# Patient Record
Sex: FEMALE | Race: WHITE | NOT HISPANIC OR LATINO | URBAN - METROPOLITAN AREA
[De-identification: names, ages, dates, MRNs, and addresses within clinical notes are randomized per-mention and may not be internally consistent; named-entity substitution may affect disease eponyms.]

---

## 2022-10-27 ENCOUNTER — APPOINTMENT (RX ONLY)
Dept: URBAN - METROPOLITAN AREA CLINIC 12 | Facility: CLINIC | Age: 62
Setting detail: DERMATOLOGY
End: 2022-10-27

## 2022-10-27 DIAGNOSIS — Z41.9 ENCOUNTER FOR PROCEDURE FOR PURPOSES OTHER THAN REMEDYING HEALTH STATE, UNSPECIFIED: ICD-10-CM

## 2022-10-27 DIAGNOSIS — Z41.1 ENCOUNTER FOR COSMETIC SURGERY: ICD-10-CM

## 2022-10-27 PROCEDURE — ? BOTOX

## 2022-10-27 PROCEDURE — ? PATIENT SPECIFIC COUNSELING

## 2022-10-27 PROCEDURE — ? CONSULTATION - ABDOMINOPLASTY

## 2022-10-27 PROCEDURE — ? CONSULTATION - AGING FACE

## 2022-10-27 PROCEDURE — ? CONSULTATION - S/P WEIGHT LOSS

## 2022-10-27 NOTE — PROCEDURE: BOTOX
Expiration Date (Month Year): 7/24
Forehead Units: 10
Perioral Units: 0
Glabellar Complex Units: 15
Price Per Unit In $ (Use Numbers Only, No Text Please.): 12.5
Bill Summary Price Listed Below, Or Bill Total Of Units X Price Per Unit?: Bill #Units x Price Per Unit
Dilution (U/0.1 Cc): 4
Consent: Written consent was obtained prior to the procedure. Risks, benefits, expectations and alternatives were discussed including, but not limited to, infection, bleeding, lid/brow ptosis, bruising, swelling, diplopia, temporary effects, incomplete chemical denervation and dissatisfaction with the cosmetic outcome. No guarantee or warranty was given or implied regarding longevity of results.
Postcare Instructions: Patient instructed to not lie down for 4 hours and limit physical activity for 24 hours. Patient instructed not to travel by airplane for 48 hours.
Administered By (Optional): Dr. Agatha Watts
Show Price In Note?: no
Lot #: [id number]
Map Statment: See attached map for complete details
Detail Level: Detailed

## 2022-11-17 ENCOUNTER — APPOINTMENT (RX ONLY)
Dept: URBAN - METROPOLITAN AREA CLINIC 12 | Facility: CLINIC | Age: 62
Setting detail: DERMATOLOGY
End: 2022-11-17

## 2022-11-17 DIAGNOSIS — Z42.8 ENCOUNTER FOR OTHER PLASTIC AND RECONSTRUCTIVE SURGERY FOLLOWING MEDICAL PROCEDURE OR HEALED INJURY: ICD-10-CM

## 2022-11-17 DIAGNOSIS — Z41.1 ENCOUNTER FOR COSMETIC SURGERY: ICD-10-CM

## 2022-11-17 PROCEDURE — ? OTHER

## 2022-11-17 PROCEDURE — ? CONSULTATION - AGING FACE

## 2022-11-17 PROCEDURE — ? PATIENT SPECIFIC COUNSELING

## 2022-11-17 ASSESSMENT — LOCATION SIMPLE DESCRIPTION DERM
LOCATION SIMPLE: GLABELLA
LOCATION SIMPLE: SUPERIOR FOREHEAD

## 2022-11-17 ASSESSMENT — LOCATION ZONE DERM: LOCATION ZONE: FACE

## 2022-11-17 ASSESSMENT — LOCATION DETAILED DESCRIPTION DERM
LOCATION DETAILED: SUPERIOR MID FOREHEAD
LOCATION DETAILED: GLABELLA

## 2022-11-17 NOTE — HPI: FOLLOW UP BOTOX
Have You Had Botox?: has had prior Botox
When Was Your Last Botox Treatment?: 10/27/2022
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 \\nPrior to her last visit, she has never had neurotoxin treatment in the past before. I explained to patient that the best way to improve static glabellar lines is with filler injections and she’s expressed that she would not like to have filler injections at this time. Patient would like to chat more about FaceTite and stated that she would not like to do the tummy tuck at this time.

## 2022-11-17 NOTE — PROCEDURE: OTHER
Sticky Other (Free Text): No need to add additional Botox today.
Detail Level: Zone
Render Risk Assessment In Note?: no

## 2022-11-22 ENCOUNTER — APPOINTMENT (RX ONLY)
Dept: URBAN - METROPOLITAN AREA OTHER 5 | Facility: OTHER | Age: 62
Setting detail: DERMATOLOGY
End: 2022-11-22

## 2022-11-22 ENCOUNTER — RX ONLY (OUTPATIENT)
Age: 62
Setting detail: RX ONLY
End: 2022-11-22

## 2022-11-22 DIAGNOSIS — Z41.1 ENCOUNTER FOR COSMETIC SURGERY: ICD-10-CM

## 2022-11-22 PROCEDURE — ? PRE-OP WORKLIST

## 2022-11-22 PROCEDURE — ? PATIENT SPECIFIC COUNSELING

## 2022-11-22 RX ORDER — CHLORHEXIDINE GLUCONATE 1.2 MG/ML
RINSE ORAL
Qty: 473 | Refills: 1 | Status: ERX | COMMUNITY
Start: 2022-11-22

## 2022-11-22 RX ORDER — AMOXICILLIN AND CLAVULANATE POTASSIUM 500; 125 MG/1; 1/1
TABLET, FILM COATED ORAL
Qty: 14 | Refills: 0 | Status: ERX | COMMUNITY
Start: 2022-11-22

## 2022-11-22 NOTE — PROCEDURE: PRE-OP WORKLIST
Admission Status: outpatient
Photos Taken?: no
Detail Level: Simple
Surgeon: Dr. Watts
Surgery Scheduled: Removal of buccal fat pads; FaceTite to lianna
Date Of Surgery: 12/2/2022

## 2022-11-22 NOTE — HPI: PREOPERATIVE APPOINTMENT
Has The Patient Completed Informed Consent?: has completed informed consent documentation
Date Of Procedure?: 12/2/2022
Surgeon: Dr. Watts

## 2022-12-02 ENCOUNTER — APPOINTMENT (RX ONLY)
Dept: URBAN - METROPOLITAN AREA CLINIC 12 | Facility: CLINIC | Age: 62
Setting detail: DERMATOLOGY
End: 2022-12-02

## 2022-12-02 DIAGNOSIS — Z41.9 ENCOUNTER FOR PROCEDURE FOR PURPOSES OTHER THAN REMEDYING HEALTH STATE, UNSPECIFIED: ICD-10-CM

## 2022-12-02 PROCEDURE — ? PATIENT SPECIFIC COUNSELING

## 2022-12-02 PROCEDURE — ? INMODE RFAL

## 2022-12-02 ASSESSMENT — LOCATION DETAILED DESCRIPTION DERM: LOCATION DETAILED: LEFT INFERIOR CENTRAL MALAR CHEEK

## 2022-12-02 ASSESSMENT — LOCATION SIMPLE DESCRIPTION DERM: LOCATION SIMPLE: LEFT CHEEK

## 2022-12-02 ASSESSMENT — LOCATION ZONE DERM: LOCATION ZONE: FACE

## 2022-12-02 NOTE — PROCEDURE: INMODE RFAL
Detail Level: Detailed
Post-Care Instructions: Patient should avoid heavy lifting or exercise for several weeks.
Infiltration: Targeted areas scrubbed with chlorhexadine gluconate, rinsed with sterile saline, intradermal blebs of tumescent local anesthetic, percutaneous infiltration with selected infiltration cannulas as per standard tumescent technique using highly dilute tumescent anesthetic solution of lidocaine, epinephrine, and bicarbonate, specific concentration used in each area is defined in surgeons orders.
Dispo: Absorptive pads and garments applied. The patient was given post-op instructions and discharged home ambulatory and in good condition.
Tumescent Anesthesia Volume In Cc: 100
Facetite Internal Cutoff Temperature: 50
Surgeon: Dr. Agatha Watts
Liposuction: Microcannular liposuction is accomplished using the standard technique with [employer] microcannnulas ([place] and [place]inesse types). The patient tolerated the procedure well. Immediately postoperatively, the treated areas were covered with super-absorbent pads and held in place with elastic compression garments.
Facetite External Cutoff Temperature: 35
Adits: 2 mm: Placed within and on the periphery of the targeted areas.
Treatment Number: 1
Consent: The risks and benefits of liposuction were discussed with the patient and include but are not limited to: infection, bruising, lumpiness, pain, anesthesia reaction, dysesthesia, scarring in treatment area or puncture point, vasovagal reactions, tachycardia, nausea, necrosis, ulceration, color change and asymmetry.
Price (Use Numbers Only, No Special Characters Or $): 5000
Handpiece: FaceTite
Was Liposuction Also Performed: No
Estimated Blood Loss (Cc): minimal

## 2022-12-19 ENCOUNTER — APPOINTMENT (RX ONLY)
Dept: URBAN - METROPOLITAN AREA OTHER 5 | Facility: OTHER | Age: 62
Setting detail: DERMATOLOGY
End: 2022-12-19

## 2022-12-19 DIAGNOSIS — Z41.1 ENCOUNTER FOR COSMETIC SURGERY: ICD-10-CM

## 2022-12-19 PROCEDURE — ? PATIENT SPECIFIC COUNSELING

## 2022-12-19 PROCEDURE — ? OTHER (COSMETIC)

## 2022-12-19 PROCEDURE — ? PRE-OP WORKLIST

## 2022-12-19 ASSESSMENT — LOCATION SIMPLE DESCRIPTION DERM: LOCATION SIMPLE: LEFT CHEEK

## 2022-12-19 ASSESSMENT — LOCATION ZONE DERM: LOCATION ZONE: FACE

## 2022-12-19 ASSESSMENT — LOCATION DETAILED DESCRIPTION DERM: LOCATION DETAILED: LEFT INFERIOR CENTRAL MALAR CHEEK

## 2022-12-19 NOTE — HPI: PREOPERATIVE APPOINTMENT
Date Of Procedure?: 01/09/2023
Facility: [facility]
Surgeon: Dr. Watts
Additional History: Patient presents today for preop\\n\\nPatient decided  to cancel surgery.\\nPatient stated that she was to concerned with post op care and to anxious. She wanted a refund.\\nPatient arrived 15 minutes early to appointment. She was unable to be seen until after my first appointment at 8:30. This was explained to patient and she stated she realized she arrived early and that would be fine. Once patient was roomed. I explained to patient that her surgery was at the outpatient center at [facility]. The patient stated she did not have any idea of why she was here for the preop. She also wanted to know why she was seeming another doctor. I explained to her that she was seeing me for a preop for her surgery not another doctor and Dr. Watts was doing her surgery. I started to explain what the preop would consist of the she stated she wanted to cancel the surgery. The patients  tried to ask if she really wanted to cancel and letting her know that it would be okay to have the surgery. Patient yelled to “stop”and continue to tell him to “stop” saying that it would be okay. She them stated she wanted to speak with someone regarding getting her money back and she had to get back to work.

## 2023-01-09 ENCOUNTER — APPOINTMENT (RX ONLY)
Dept: URBAN - METROPOLITAN AREA CLINIC 12 | Facility: CLINIC | Age: 63
Setting detail: DERMATOLOGY
End: 2023-01-09

## 2023-01-09 DIAGNOSIS — Z41.1 ENCOUNTER FOR COSMETIC SURGERY: ICD-10-CM

## 2023-01-09 PROCEDURE — ? PRE-OP WORKLIST

## 2023-01-09 PROCEDURE — ? OTHER (COSMETIC)

## 2023-01-09 NOTE — PROCEDURE: PRE-OP WORKLIST
Surgeon: Dr. Watts
Detail Level: Simple
Surgery Scheduled: Facetite and buccal fat pad removal
Date Of Evaluation: 12/19/2022
Admission Status: outpatient
Date Of Surgery: 01/09/2022

## 2023-01-09 NOTE — HPI: PREOPERATIVE APPOINTMENT
Date Of Procedure?: 1/09/022
Facility: [facility]
Surgeon: Dr. Watts
Additional History: Patient decided  to cancel surgery.\\nPatient stated that she was to concerned with post op care and to anxious. She wanted a refund.\\nPatient arrived 15 minutes early to appointment. She was unable to be seen until after my first appointment at 8:30. This was explained to patient and she stated she realized she arrived early and that would be fine. Once patient was roomed. I explained to patient that her surgery was at the outpatient center at [facility]. The patient stated she did not have any idea of why she was here for the preop. She also wanted to know why she was seeming another doctor. I explained to her that she was seeing me for a preop for her surgery not another doctor and Dr. Watts was doing her surgery. I started to explain what the preop would consist of the she stated she wanted to cancel the surgery. The patients  tried to ask if she really wanted to cancel and letting her know that it would be okay to have the surgery. Patient yelled to “stop”and continue to tell him to “stop” saying that it would be okay. She them stated she wanted to speak with someone regarding getting her money back and she had to get back to work.

## 2023-05-03 ENCOUNTER — APPOINTMENT (RX ONLY)
Dept: URBAN - METROPOLITAN AREA CLINIC 12 | Facility: CLINIC | Age: 63
Setting detail: DERMATOLOGY
End: 2023-05-03

## 2023-05-03 DIAGNOSIS — Z41.1 ENCOUNTER FOR COSMETIC SURGERY: ICD-10-CM

## 2023-05-03 PROCEDURE — ? BOTOX

## 2023-05-03 ASSESSMENT — LOCATION ZONE DERM: LOCATION ZONE: FACE

## 2023-05-03 ASSESSMENT — LOCATION SIMPLE DESCRIPTION DERM: LOCATION SIMPLE: LEFT FOREHEAD

## 2023-05-03 ASSESSMENT — LOCATION DETAILED DESCRIPTION DERM: LOCATION DETAILED: LEFT INFERIOR MEDIAL FOREHEAD

## 2023-05-03 NOTE — PROCEDURE: BOTOX
Additional Area 2 Location: gummy smile
Additional Area 1 Location: chin
Reconstitution Date: 05/03/2023
Mian Units: 0
Price Per Unit In $ (Use Numbers Only, No Text Please.): 15
Administered By (Optional): Berny Patrick PA-C
Expiration Date (Month Year): 04/25
Dilution (U/0.1 Cc): 5
Lot #: [id number]
Map Statment: See attached map for complete details
Consent: Written consent was obtained prior to the procedure. Risks, benefits, expectations and alternatives were discussed including, but not limited to, infection, bleeding, lid/brow ptosis, bruising, swelling, diplopia, temporary effects, incomplete chemical denervation and dissatisfaction with the cosmetic outcome. No guarantee or warranty was given or implied regarding longevity of results.
Use Map Statement For Sites (Optional): No
Bill Summary Price Listed Below, Or Bill Total Of Units X Price Per Unit?: Bill #Units x Price Per Unit
Detail Level: Simple
Show Price In Note?: yes
Glabellar Complex Units: 17.5
Forehead Units: 7.5
Postcare Instructions: Patient instructed to not lie down for 4 hours and limit physical activity for 24 hours. Patient instructed not to travel by airplane for 48 hours.
Additional Area 3 Location: perioral

## 2023-10-03 ENCOUNTER — APPOINTMENT (RX ONLY)
Dept: URBAN - METROPOLITAN AREA CLINIC 12 | Facility: CLINIC | Age: 63
Setting detail: DERMATOLOGY
End: 2023-10-03

## 2023-10-03 DIAGNOSIS — Z41.9 ENCOUNTER FOR PROCEDURE FOR PURPOSES OTHER THAN REMEDYING HEALTH STATE, UNSPECIFIED: ICD-10-CM

## 2023-10-03 PROCEDURE — ? BOTOX

## 2023-10-03 NOTE — PROCEDURE: BOTOX
Additional Area 3 Location: masseter
Use Map Statement For Sites (Optional): No
Detail Level: Simple
Platsyma Units: 0
Map Statment: See attached map for complete details
Reconstitution Date: 9/28/23
Postcare Instructions: Patient instructed to not lie down for 4 hours and limit physical activity for 24 hours. Patient instructed not to travel by airplane for 48 hours.
Forehead Units: 7.5
Expiration Date (Month Year): 12/01/25
Additional Area 2 Location: platysma
Lot #: [id number]
Glabellar Complex Units: 17.5
Dilution (U/0.1 Cc): 2.2
Additional Area 1 Location: chin
Consent: Written consent was obtained prior to the procedure. Risks, benefits, expectations and alternatives were discussed including, but not limited to, infection, bleeding, lid/brow ptosis, bruising, swelling, diplopia, temporary effects, incomplete chemical denervation and dissatisfaction with the cosmetic outcome. No guarantee or warranty was given or implied regarding longevity of results.
Administered By (Optional): Berny Patrick PA-C
Show Price In Note?: yes
Bill Summary Price Listed Below, Or Bill Total Of Units X Price Per Unit?: Bill #Units x Price Per Unit
Price Per Unit In $ (Use Numbers Only, No Text Please.): 18

## 2024-02-15 ENCOUNTER — APPOINTMENT (RX ONLY)
Dept: URBAN - METROPOLITAN AREA CLINIC 12 | Facility: CLINIC | Age: 64
Setting detail: DERMATOLOGY
End: 2024-02-15

## 2024-02-15 DIAGNOSIS — Z41.9 ENCOUNTER FOR PROCEDURE FOR PURPOSES OTHER THAN REMEDYING HEALTH STATE, UNSPECIFIED: ICD-10-CM

## 2024-02-15 PROCEDURE — ? BOTOX

## 2024-02-15 NOTE — PROCEDURE: BOTOX
Additional Area 3 Location: masseter
Use Map Statement For Sites (Optional): No
Detail Level: Simple
Platsyma Units: 0
Map Statment: See attached map for complete details
Reconstitution Date: 2/15/24
Postcare Instructions: Patient instructed to not lie down for 4 hours and limit physical activity for 24 hours. Patient instructed not to travel by airplane for 48 hours.
Forehead Units: 7.5
Expiration Date (Month Year): 3/26
Additional Area 2 Location: platysma
Lot #: [id number]
Glabellar Complex Units: 17.5
Dilution (U/0.1 Cc): 2.2
Additional Area 1 Location: chin
Consent: Written consent was obtained prior to the procedure. Risks, benefits, expectations and alternatives were discussed including, but not limited to, infection, bleeding, lid/brow ptosis, bruising, swelling, diplopia, temporary effects, incomplete chemical denervation and dissatisfaction with the cosmetic outcome. No guarantee or warranty was given or implied regarding longevity of results.
Administered By (Optional): Berny Patrick PA-C
Show Price In Note?: yes
Bill Summary Price Listed Below, Or Bill Total Of Units X Price Per Unit?: Bill #Units x Price Per Unit
Price Per Unit In $ (Use Numbers Only, No Text Please.): 14

## 2024-06-10 ENCOUNTER — APPOINTMENT (RX ONLY)
Dept: URBAN - METROPOLITAN AREA CLINIC 12 | Facility: CLINIC | Age: 64
Setting detail: DERMATOLOGY
End: 2024-06-10

## 2024-06-10 DIAGNOSIS — Z41.9 ENCOUNTER FOR PROCEDURE FOR PURPOSES OTHER THAN REMEDYING HEALTH STATE, UNSPECIFIED: ICD-10-CM

## 2024-06-10 PROCEDURE — ? BOTOX

## 2024-06-10 NOTE — PROCEDURE: BOTOX
Additional Area 3 Location: masseter
Use Map Statement For Sites (Optional): No
Detail Level: Simple
Platsyma Units: 0
Map Statment: See attached map for complete details
Reconstitution Date: 6/10/24
Postcare Instructions: Patient instructed to not lie down for 4 hours and limit physical activity for 24 hours. Patient instructed not to travel by airplane for 48 hours.
Forehead Units: 7.5
Expiration Date (Month Year): 5/26
Additional Area 2 Location: platysma
Lot #: [id number]
Glabellar Complex Units: 17.5
Dilution (U/0.1 Cc): 2.2
Additional Area 1 Location: chin
Consent: Written consent was obtained prior to the procedure. Risks, benefits, expectations and alternatives were discussed including, but not limited to, infection, bleeding, lid/brow ptosis, bruising, swelling, diplopia, temporary effects, incomplete chemical denervation and dissatisfaction with the cosmetic outcome. No guarantee or warranty was given or implied regarding longevity of results.
Administered By (Optional): Berny Patrick PA-C
Show Price In Note?: yes
Bill Summary Price Listed Below, Or Bill Total Of Units X Price Per Unit?: Bill #Units x Price Per Unit
Price Per Unit In $ (Use Numbers Only, No Text Please.): 15

## 2024-11-26 ENCOUNTER — APPOINTMENT (RX ONLY)
Dept: URBAN - METROPOLITAN AREA CLINIC 12 | Facility: CLINIC | Age: 64
Setting detail: DERMATOLOGY
End: 2024-11-26

## 2024-11-26 DIAGNOSIS — Z41.9 ENCOUNTER FOR PROCEDURE FOR PURPOSES OTHER THAN REMEDYING HEALTH STATE, UNSPECIFIED: ICD-10-CM

## 2024-11-26 PROCEDURE — ? BOTOX

## 2024-11-26 NOTE — PROCEDURE: BOTOX
Additional Area 3 Location: masseter
Use Map Statement For Sites (Optional): No
Detail Level: Simple
Platsyma Units: 0
Map Statment: See attached map for complete details
Postcare Instructions: Patient instructed to not lie down for 4 hours and limit physical activity for 24 hours. Patient instructed not to travel by airplane for 48 hours.
Forehead Units: 7.5
Expiration Date (Month Year): 9/26
Additional Area 2 Location: platysma
Lot #: [id number]
Glabellar Complex Units: 17.5
Dilution (U/0.1 Cc): 2.2
Additional Area 1 Location: chin
Consent: Written consent was obtained prior to the procedure. Risks, benefits, expectations and alternatives were discussed including, but not limited to, infection, bleeding, lid/brow ptosis, bruising, swelling, diplopia, temporary effects, incomplete chemical denervation and dissatisfaction with the cosmetic outcome. No guarantee or warranty was given or implied regarding longevity of results.
Administered By (Optional): Berny Patrick PA-C
Show Price In Note?: yes
Bill Summary Price Listed Below, Or Bill Total Of Units X Price Per Unit?: Bill #Units x Price Per Unit
Price Per Unit In $ (Use Numbers Only, No Text Please.): 15

## 2025-03-26 ENCOUNTER — APPOINTMENT (OUTPATIENT)
Dept: URBAN - METROPOLITAN AREA CLINIC 12 | Facility: CLINIC | Age: 65
Setting detail: DERMATOLOGY
End: 2025-03-26

## 2025-03-26 DIAGNOSIS — Z41.9 ENCOUNTER FOR PROCEDURE FOR PURPOSES OTHER THAN REMEDYING HEALTH STATE, UNSPECIFIED: ICD-10-CM

## 2025-03-26 PROCEDURE — ? BOTOX

## 2025-03-26 NOTE — PROCEDURE: BOTOX
Additional Area 3 Location: masseter
Use Map Statement For Sites (Optional): No
Detail Level: Simple
Platsyma Units: 0
Map Statment: See attached map for complete details
Postcare Instructions: Patient instructed to not lie down for 4 hours and limit physical activity for 24 hours. Patient instructed not to travel by airplane for 48 hours.
Forehead Units: 7.5
Expiration Date (Month Year): 3/27
Additional Area 2 Location: platysma
Lot #: [id number]
Glabellar Complex Units: 17.5
Dilution (U/0.1 Cc): 2.2
Additional Area 1 Location: chin
Consent: Written consent was obtained prior to the procedure. Risks, benefits, expectations and alternatives were discussed including, but not limited to, infection, bleeding, lid/brow ptosis, bruising, swelling, diplopia, temporary effects, incomplete chemical denervation and dissatisfaction with the cosmetic outcome. No guarantee or warranty was given or implied regarding longevity of results.
Administered By (Optional): Berny Patrick PA-C
Show Price In Note?: yes
Bill Summary Price Listed Below, Or Bill Total Of Units X Price Per Unit?: Bill #Units x Price Per Unit
Price Per Unit In $ (Use Numbers Only, No Text Please.): 15